# Patient Record
Sex: FEMALE | Race: WHITE | Employment: FULL TIME | RURAL
[De-identification: names, ages, dates, MRNs, and addresses within clinical notes are randomized per-mention and may not be internally consistent; named-entity substitution may affect disease eponyms.]

---

## 2020-08-06 ENCOUNTER — VIRTUAL VISIT (OUTPATIENT)
Dept: FAMILY MEDICINE CLINIC | Age: 40
End: 2020-08-06

## 2020-08-06 NOTE — PROGRESS NOTES
Chief Complaint   Patient presents with    Documentation     1. Have you been to the ER, urgent care clinic since your last visit? Hospitalized since your last visit? No    2. Have you seen or consulted any other health care providers outside of the [address] [place] [address] since your last visit? Include any pap smears or colon screening.  No    Reviewed record in preparation for visit and have necessary documentation  Pt did not bring medication to office visit for review  opportunity was given for questions  Goals that were addressed and/or need to be completed during or after this appointment include   Health Maintenance Due   Topic Date Due    DTaP/Tdap/Td series (1 - Tdap) 06/29/2003    PAP AKA CERVICAL CYTOLOGY  11/14/2015    Influenza Age 9 to Adult  08/01/2020

## 2020-08-06 NOTE — PROGRESS NOTES
Initially able to contact patient, but afterwards only getting VM. Called multiple times and sent Doxi link as well.     MD OTTO Gomez & DEVAUGHN POWELL [facility]  08/06/20

## 2025-01-08 ENCOUNTER — OFFICE VISIT (OUTPATIENT)
Age: 45
End: 2025-01-08
Payer: MEDICAID

## 2025-01-08 VITALS
DIASTOLIC BLOOD PRESSURE: 86 MMHG | HEIGHT: 59 IN | HEART RATE: 82 BPM | SYSTOLIC BLOOD PRESSURE: 121 MMHG | BODY MASS INDEX: 38.87 KG/M2 | WEIGHT: 192.8 LBS

## 2025-01-08 DIAGNOSIS — Z01.419 ENCOUNTER FOR GYNECOLOGICAL EXAMINATION: Primary | ICD-10-CM

## 2025-01-08 DIAGNOSIS — N89.8 VAGINAL DISCHARGE: ICD-10-CM

## 2025-01-08 DIAGNOSIS — Z11.51 ENCOUNTER FOR SCREENING FOR HUMAN PAPILLOMAVIRUS (HPV): ICD-10-CM

## 2025-01-08 DIAGNOSIS — B96.89 BV (BACTERIAL VAGINOSIS): ICD-10-CM

## 2025-01-08 DIAGNOSIS — N89.8 VAGINAL ODOR: ICD-10-CM

## 2025-01-08 DIAGNOSIS — R45.86 MOOD CHANGE: ICD-10-CM

## 2025-01-08 DIAGNOSIS — Z12.4 CERVICAL CANCER SCREENING: ICD-10-CM

## 2025-01-08 DIAGNOSIS — N39.498 OTHER URINARY INCONTINENCE: ICD-10-CM

## 2025-01-08 DIAGNOSIS — R10.2 PELVIC PAIN IN FEMALE: ICD-10-CM

## 2025-01-08 DIAGNOSIS — N92.0 MENORRHAGIA WITH REGULAR CYCLE: ICD-10-CM

## 2025-01-08 DIAGNOSIS — N76.0 BV (BACTERIAL VAGINOSIS): ICD-10-CM

## 2025-01-08 LAB
BILIRUBIN, URINE, POC: NEGATIVE
BLOOD URINE, POC: ABNORMAL
GLUCOSE URINE, POC: NEGATIVE
KETONES, URINE, POC: NEGATIVE
LEUKOCYTE ESTERASE, URINE, POC: NEGATIVE
NITRITE, URINE, POC: NEGATIVE
PH, URINE, POC: 5.5 (ref 4.6–8)
PROTEIN,URINE, POC: NEGATIVE
SPECIFIC GRAVITY, URINE, POC: 1.01 (ref 1–1.03)
URINALYSIS CLARITY, POC: CLEAR
URINALYSIS COLOR, POC: YELLOW
UROBILINOGEN, POC: ABNORMAL

## 2025-01-08 PROCEDURE — 99459 PELVIC EXAMINATION: CPT | Performed by: OBSTETRICS & GYNECOLOGY

## 2025-01-08 PROCEDURE — 81003 URINALYSIS AUTO W/O SCOPE: CPT | Performed by: OBSTETRICS & GYNECOLOGY

## 2025-01-08 PROCEDURE — 99204 OFFICE O/P NEW MOD 45 MIN: CPT | Performed by: OBSTETRICS & GYNECOLOGY

## 2025-01-08 RX ORDER — MULTIVIT-MIN/IRON/FOLIC ACID/K 18-600-40
CAPSULE ORAL DAILY
COMMUNITY

## 2025-01-08 RX ORDER — METRONIDAZOLE 500 MG/1
500 TABLET ORAL 2 TIMES DAILY
Qty: 14 TABLET | Refills: 0 | Status: SHIPPED | OUTPATIENT
Start: 2025-01-08 | End: 2025-01-11

## 2025-01-08 RX ORDER — THIAMINE MONONITRATE (VIT B1) 100 MG
100 TABLET ORAL DAILY
COMMUNITY

## 2025-01-08 RX ORDER — MULTIVIT WITH MINERALS/LUTEIN
250 TABLET ORAL DAILY
COMMUNITY

## 2025-01-08 RX ORDER — DROSPIRENONE 4 MG/1
1 TABLET, FILM COATED ORAL DAILY
Qty: 90 TABLET | Refills: 0 | Status: SHIPPED | OUTPATIENT
Start: 2025-01-08

## 2025-01-08 NOTE — PROGRESS NOTES
[facility]  [phone]    Marta Chavez MD, FACOG        Annual Gynecologic Exam:  Chief Complaint   Patient presents with    Annual Exam    New Patient     Last seen in 2016    Perimenopausal Symptoms    Urinary Incontinence       Sita Brunner is a ,  44 y.o. female   Patient's last menstrual period was 2024.    The patient presents for her annual gynecologic checkup but also has several concerns she ann like to discuss today.    The patient is having problems - multiple.  Heavy periods  Painful periods  UI x 2 mos: random  VD x 6mos: with odor     Had US with FW ~ 6mos  Maternal menopause ~ 39 yo    Co hot flashes: x 1 year.    Co skin lesions on back that keep recurring.    Per Rooming Note:  Patient's last menstrual period was 2024.  Her periods are heavy in flow and usually regular with a 26-32 day interval with 3-7 day duration.  She has dysmenorrhea.  Problems: c/o hot flashes, mood swings, wt gain, wondering about menopause. Reports some urinary incontinence, pt wondering if it is d/t scar tissue from .   Non GYN issues: pt reports she saw [facility] last year for GYN care (first one since 2016), they gave her prozac & she has been without prozac the past 2 months & needs a refill. Pt also reports some scarring on her back that she noticed about 2 months ago that has NI with hydrocortisone cream. Pt reports she has a PCP but does not see regularly.   Birth Control: tubal ligation.  Last Pap: 366+ days ago per pt WNL at [facility].   She does not have a history of KWADWO 2, 3 or cervical cancer.   Last Mammogram: has never had a mammogram. Advised to schedule.   Last Bone Density: never obtained.   Last colonoscopy: never obtained.     Sexual history and Contraception:    Social History     Substance and Sexual Activity   Sexual Activity Yes    Partners: Male    Birth control/protection: Surgical    Comment: tubal      History reviewed. No pertinent past medical 
Sita Brunner is a 44 y.o. female returns for an annual exam     Chief Complaint   Patient presents with    Annual Exam    New Patient     Last seen in 2016    Perimenopausal Symptoms    Urinary Incontinence       Patient's last menstrual period was 2024.  Her periods are heavy in flow and usually regular with a 26-32 day interval with 3-7 day duration.  She has dysmenorrhea.  Problems: c/o hot flashes, mood swings, wt gain, wondering about menopause. Reports some urinary incontinence, pt wondering if it is d/t scar tissue from .   Non GYN issues: pt reports she saw [facility] last year for GYN care (first one since ), they gave her prozac & she has been without prozac the past 2 months & needs a refill. Pt also reports some scarring on her back that she noticed about 2 months ago that has NI with hydrocortisone cream. Pt reports she has a PCP but does not see regularly.   Birth Control: tubal ligation.  Last Pap: 366+ days ago per pt WNL at [facility].   She does not have a history of KWADWO 2, 3 or cervical cancer.   Last Mammogram: has never had a mammogram. Advised to schedule.   Last Bone Density: never obtained.   Last colonoscopy: never obtained.     Examination chaperoned by Sandie Burciaga MA.    
Candida albicans, SAL Negative Negative    Candida glabrata Negative Negative    Trichomonas Vaginalis by SAL Negative Negative    Chlamydia trachomatis, SAL Negative Negative    Neisseria Gonorrhoeae, SAL Negative Negative   Culture, Urine    Specimen: Urine, clean catch    Urine, clean catch   Result Value Ref Range    Urine Culture, Routine       Mixed urogenital rosemary  10,000-25,000 colony forming units per mL     PAP IG, Aptima HPV and rfx 16/18,45 ([id number])   Result Value Ref Range    Diagnosis: Comment     Specimen adequacy: Comment     Clinician Provided ICD Comment     Performed by: Comment     . .     Note: Comment     Methodology: Comment     HPV Aptima Negative Negative    HPV Genotype Reflex Comment    AMB POC URINALYSIS DIP STICK AUTO W/O MICRO   Result Value Ref Range    Color, Urine, POC Yellow     Clarity, Urine, POC Clear     Glucose, Urine, POC Negative     Bilirubin, Urine, POC Negative     Ketones, Urine, POC Negative     Specific Gravity, Urine, POC 1.015 1.001 - 1.035    Blood, Urine, POC Trace-lysed Negative    pH, Urine, POC 5.5 4.6 - 8.0    Protein, Urine, POC Negative     Urobilinogen, POC 0.2 mg/dL     Nitrite, Urine, POC Negative     Leukocyte Esterase, Urine, POC Negative        No follow-ups on file.    A pelvic exam and associated supplies were necessary for evaluation of the patient for this type of visit.        Provider chaperoned and assisted by: Lucille Buckner LPN for pelvic exam and additional supplies used for pelvic exam.

## 2025-01-10 ENCOUNTER — TELEPHONE (OUTPATIENT)
Age: 45
End: 2025-01-10

## 2025-01-10 LAB
A VAGINAE DNA VAG QL NAA+PROBE: ABNORMAL SCORE
BACTERIA UR CULT: NORMAL
BVAB2 DNA VAG QL NAA+PROBE: ABNORMAL SCORE
C ALBICANS DNA VAG QL NAA+PROBE: NEGATIVE
C GLABRATA DNA VAG QL NAA+PROBE: NEGATIVE
C TRACH DNA SPEC QL NAA+PROBE: NEGATIVE
MEGA1 DNA VAG QL NAA+PROBE: ABNORMAL SCORE
N GONORRHOEA DNA VAG QL NAA+PROBE: NEGATIVE
T VAGINALIS DNA VAG QL NAA+PROBE: NEGATIVE

## 2025-01-10 NOTE — TELEPHONE ENCOUNTER
Two patient identifier used      44 year old patient last seen in the office on 1/8/2024 for new patient ae.    Patient has viewed her lab results on her my chart and would like to know how to proceed.    Please review and advise    Thank you

## 2025-01-11 LAB
CYTOLOGIST CVX/VAG CYTO: NORMAL
CYTOLOGY CVX/VAG DOC CYTO: NORMAL
CYTOLOGY CVX/VAG DOC THIN PREP: NORMAL
DX ICD CODE: NORMAL
HPV GENOTYPE REFLEX: NORMAL
HPV I/H RISK 4 DNA CVX QL PROBE+SIG AMP: NEGATIVE
Lab: NORMAL
OTHER STN SPEC: NORMAL
STAT OF ADQ CVX/VAG CYTO-IMP: NORMAL

## 2025-01-11 RX ORDER — METRONIDAZOLE 500 MG/1
500 TABLET ORAL 2 TIMES DAILY
Qty: 14 TABLET | Refills: 0 | Status: SHIPPED | OUTPATIENT
Start: 2025-01-11 | End: 2025-01-18

## 2025-01-20 SDOH — HEALTH STABILITY: PHYSICAL HEALTH: ON AVERAGE, HOW MANY DAYS PER WEEK DO YOU ENGAGE IN MODERATE TO STRENUOUS EXERCISE (LIKE A BRISK WALK)?: 5 DAYS

## 2025-01-22 ENCOUNTER — OFFICE VISIT (OUTPATIENT)
Facility: CLINIC | Age: 45
End: 2025-01-22
Payer: MEDICAID

## 2025-01-22 VITALS
BODY MASS INDEX: 38.18 KG/M2 | TEMPERATURE: 98 F | OXYGEN SATURATION: 100 % | DIASTOLIC BLOOD PRESSURE: 77 MMHG | RESPIRATION RATE: 18 BRPM | SYSTOLIC BLOOD PRESSURE: 112 MMHG | HEART RATE: 80 BPM | HEIGHT: 59 IN | WEIGHT: 189.4 LBS

## 2025-01-22 DIAGNOSIS — Z76.89 ENCOUNTER TO ESTABLISH CARE: ICD-10-CM

## 2025-01-22 DIAGNOSIS — R53.82 CHRONIC FATIGUE: ICD-10-CM

## 2025-01-22 DIAGNOSIS — Z83.49 FAMILY HISTORY OF THYROID DISEASE: ICD-10-CM

## 2025-01-22 DIAGNOSIS — Z13.1 DIABETES MELLITUS SCREENING: ICD-10-CM

## 2025-01-22 DIAGNOSIS — Z11.59 NEED FOR HEPATITIS C SCREENING TEST: ICD-10-CM

## 2025-01-22 DIAGNOSIS — F33.1 MODERATE EPISODE OF RECURRENT MAJOR DEPRESSIVE DISORDER (HCC): Primary | ICD-10-CM

## 2025-01-22 DIAGNOSIS — K04.7 DENTAL INFECTION: ICD-10-CM

## 2025-01-22 DIAGNOSIS — E78.2 MIXED HYPERLIPIDEMIA: ICD-10-CM

## 2025-01-22 PROCEDURE — 99214 OFFICE O/P EST MOD 30 MIN: CPT | Performed by: FAMILY MEDICINE

## 2025-01-22 RX ORDER — MELOXICAM 15 MG/1
15 TABLET ORAL DAILY PRN
Qty: 30 TABLET | Refills: 1 | Status: SHIPPED | OUTPATIENT
Start: 2025-01-22

## 2025-01-22 RX ORDER — FLUOXETINE 40 MG/1
80 CAPSULE ORAL DAILY
Qty: 180 CAPSULE | Refills: 1 | Status: SHIPPED | OUTPATIENT
Start: 2025-01-22

## 2025-01-22 RX ORDER — HYDROXYZINE HYDROCHLORIDE 25 MG/1
25 TABLET, FILM COATED ORAL EVERY 8 HOURS PRN
Qty: 90 TABLET | Refills: 1 | Status: SHIPPED | OUTPATIENT
Start: 2025-01-22

## 2025-01-22 SDOH — ECONOMIC STABILITY: FOOD INSECURITY: WITHIN THE PAST 12 MONTHS, THE FOOD YOU BOUGHT JUST DIDN'T LAST AND YOU DIDN'T HAVE MONEY TO GET MORE.: NEVER TRUE

## 2025-01-22 SDOH — ECONOMIC STABILITY: FOOD INSECURITY: WITHIN THE PAST 12 MONTHS, YOU WORRIED THAT YOUR FOOD WOULD RUN OUT BEFORE YOU GOT MONEY TO BUY MORE.: NEVER TRUE

## 2025-01-22 ASSESSMENT — PATIENT HEALTH QUESTIONNAIRE - PHQ9
SUM OF ALL RESPONSES TO PHQ9 QUESTIONS 1 & 2: 6
5. POOR APPETITE OR OVEREATING: NEARLY EVERY DAY
7. TROUBLE CONCENTRATING ON THINGS, SUCH AS READING THE NEWSPAPER OR WATCHING TELEVISION: NEARLY EVERY DAY
SUM OF ALL RESPONSES TO PHQ QUESTIONS 1-9: 21
2. FEELING DOWN, DEPRESSED OR HOPELESS: NEARLY EVERY DAY
3. TROUBLE FALLING OR STAYING ASLEEP: NEARLY EVERY DAY
SUM OF ALL RESPONSES TO PHQ QUESTIONS 1-9: 21
4. FEELING TIRED OR HAVING LITTLE ENERGY: NEARLY EVERY DAY
SUM OF ALL RESPONSES TO PHQ QUESTIONS 1-9: 21
1. LITTLE INTEREST OR PLEASURE IN DOING THINGS: NEARLY EVERY DAY
SUM OF ALL RESPONSES TO PHQ QUESTIONS 1-9: 21
9. THOUGHTS THAT YOU WOULD BE BETTER OFF DEAD, OR OF HURTING YOURSELF: NOT AT ALL
10. IF YOU CHECKED OFF ANY PROBLEMS, HOW DIFFICULT HAVE THESE PROBLEMS MADE IT FOR YOU TO DO YOUR WORK, TAKE CARE OF THINGS AT HOME, OR GET ALONG WITH OTHER PEOPLE: SOMEWHAT DIFFICULT
8. MOVING OR SPEAKING SO SLOWLY THAT OTHER PEOPLE COULD HAVE NOTICED. OR THE OPPOSITE, BEING SO FIGETY OR RESTLESS THAT YOU HAVE BEEN MOVING AROUND A LOT MORE THAN USUAL: NOT AT ALL
6. FEELING BAD ABOUT YOURSELF - OR THAT YOU ARE A FAILURE OR HAVE LET YOURSELF OR YOUR FAMILY DOWN: NEARLY EVERY DAY

## 2025-01-22 ASSESSMENT — ENCOUNTER SYMPTOMS
CHEST TIGHTNESS: 0
ABDOMINAL PAIN: 0
ABDOMINAL DISTENTION: 0
APNEA: 0

## 2025-01-22 ASSESSMENT — COLUMBIA-SUICIDE SEVERITY RATING SCALE - C-SSRS
6. HAVE YOU EVER DONE ANYTHING, STARTED TO DO ANYTHING, OR PREPARED TO DO ANYTHING TO END YOUR LIFE?: NO
2. HAVE YOU ACTUALLY HAD ANY THOUGHTS OF KILLING YOURSELF?: NO
1. WITHIN THE PAST MONTH, HAVE YOU WISHED YOU WERE DEAD OR WISHED YOU COULD GO TO SLEEP AND NOT WAKE UP?: NO

## 2025-01-22 ASSESSMENT — ANXIETY QUESTIONNAIRES
GAD7 TOTAL SCORE: 17
4. TROUBLE RELAXING: MORE THAN HALF THE DAYS
7. FEELING AFRAID AS IF SOMETHING AWFUL MIGHT HAPPEN: SEVERAL DAYS
1. FEELING NERVOUS, ANXIOUS, OR ON EDGE: NEARLY EVERY DAY
2. NOT BEING ABLE TO STOP OR CONTROL WORRYING: NEARLY EVERY DAY
IF YOU CHECKED OFF ANY PROBLEMS ON THIS QUESTIONNAIRE, HOW DIFFICULT HAVE THESE PROBLEMS MADE IT FOR YOU TO DO YOUR WORK, TAKE CARE OF THINGS AT HOME, OR GET ALONG WITH OTHER PEOPLE: SOMEWHAT DIFFICULT
6. BECOMING EASILY ANNOYED OR IRRITABLE: NEARLY EVERY DAY
3. WORRYING TOO MUCH ABOUT DIFFERENT THINGS: NEARLY EVERY DAY

## 2025-01-22 NOTE — PROGRESS NOTES
\"Have you been to the ER, urgent care clinic since your last visit?  Hospitalized since your last visit?\"    New Pat    “Have you seen or consulted any other health care providers outside of [facility] since your last visit?”    New Pat    Have you had a mammogram?”   Go today    No breast cancer screening on file             Click Here for Release of Records Request   
screening test  Z11.59 Hepatitis C Antibody      6. Diabetes mellitus screening  Z13.1 Hemoglobin A1C      7. Dental infection  K04.7 meloxicam (MOBIC) 15 MG tablet     amoxicillin-clavulanate (AUGMENTIN) 875-125 MG per tablet      8. Encounter to establish care  Z76.89           1. Moderate episode of recurrent major depressive disorder (HCC)  Restarint Paxil, awaitng Thyroid studies as well  - TSH; Future  - T4, Free; Future  - FLUoxetine (PROZAC) 40 MG capsule; Take 2 capsules by mouth daily  Dispense: 180 capsule; Refill: 1  - hydrOXYzine HCl (ATARAX) 25 MG tablet; Take 1 tablet by mouth every 8 hours as needed for Itching  Dispense: 90 tablet; Refill: 1    2. Family history of thyroid disease  - Comprehensive Metabolic Panel; Future  - CBC; Future  - Thyroid Peroxidase Antibody; Future    3. Mixed hyperlipidemia  The patient is aware of our goal to reduce or eliminate the long term problems (such as strokes and heart attacks) related to poorly controlled Triglycerides, LDL, Cholesterol.   - Lipid Panel; Future    4. Chronic fatigue  Concern for MD and Hypothyroidism  - Vitamin B12; Future  - Vitamin D 25 Hydroxy; Future    5. Need for hepatitis C screening test  - Hepatitis C Antibody; Future    6. Diabetes mellitus screening  - Hemoglobin A1C; Future    7. Dental infection  - meloxicam (MOBIC) 15 MG tablet; Take 1 tablet by mouth daily as needed for Pain  Dispense: 30 tablet; Refill: 1  - amoxicillin-clavulanate (AUGMENTIN) 875-125 MG per tablet; Take 1 tablet by mouth 2 times daily for 7 days  Dispense: 14 tablet; Refill: 0    8. Encounter to establish care       Follow-up and Dispositions    Return for TBD based on labs.           I have discussed the diagnosis with the patient and the intended plan as seen in the above orders.  Social history, medical history, and labs were reviewed.  The patient has received an after-visit summary and questions were answered concerning future plans.  I have discussed

## 2025-03-24 DIAGNOSIS — F33.1 MODERATE EPISODE OF RECURRENT MAJOR DEPRESSIVE DISORDER (HCC): ICD-10-CM

## 2025-03-24 RX ORDER — FLUOXETINE HYDROCHLORIDE 40 MG/1
80 CAPSULE ORAL DAILY
Qty: 180 CAPSULE | Refills: 1 | Status: SHIPPED | OUTPATIENT
Start: 2025-03-24